# Patient Record
Sex: FEMALE | Race: WHITE | Employment: STUDENT | URBAN - METROPOLITAN AREA
[De-identification: names, ages, dates, MRNs, and addresses within clinical notes are randomized per-mention and may not be internally consistent; named-entity substitution may affect disease eponyms.]

---
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
## 2022-01-29 ENCOUNTER — HEALTH MAINTENANCE LETTER (OUTPATIENT)
Age: 22
End: 2022-01-29

## 2022-03-04 ENCOUNTER — TELEPHONE (OUTPATIENT)
Dept: FAMILY MEDICINE | Facility: CLINIC | Age: 22
End: 2022-03-04
Payer: COMMERCIAL

## 2022-03-04 NOTE — TELEPHONE ENCOUNTER
Patient's father called, stated his name is Dr. Tank Mejia, requests call back regarding his daughter's skin condition.   Father requests a call to [phone].     Thank you!

## 2022-03-04 NOTE — TELEPHONE ENCOUNTER
Left message on answering machine for patient to call back. Advised that father called and left message to all him back  Advise that we would like to speak with her about her condition or she can send a [employer] message with questions

## 2022-03-07 NOTE — TELEPHONE ENCOUNTER
[facility] Call Center    Phone Message    May a detailed message be left on voicemail: no     Reason for Call: Other: Father Sachin wanted to discuss his daughter's appt without discussing details of her condition. Advised that we reached out to Migdalia Buckley to get consent. Please call Sachin at [phone]. Thank you.     Action Taken: Message routed to:  Other: [facility]    Travel Screening: Not Applicable

## 2022-03-07 NOTE — TELEPHONE ENCOUNTER
Called and spoke to patient.    Patient sent [employer] message giving consent for RN to speak w/ her father, see below:  Hello I received a voicemail saying my father called and he would like to speak to my doctor about some medical questions. My father is a doctor as well and would like to discuss questions that I myself cannot ask/know to ask. He lives in a different state from [place] so he would like to be able to call you himself, so if you could give him a call back that would be great! His number is [phone]! He may try calling again on Monday. Thank you    RN informed patient she will give her father a call.    Patient voiced understanding.    ARPIT Tena-BSN-PHN  [facility]  [phone]

## 2022-03-07 NOTE — TELEPHONE ENCOUNTER
Called and spoke to patients fatherSachin (verbal/written consent given by patient).    Per Sachin patient has hx of tinea versicolor. Patients father and sister also have hx of tinea versicolor, both successfully treated w/ fluconazole pills.    Per Sachin patients tinea is starting to spread to her face- hoping to get patient in for an earlier appointment.    Scheduled following day appointment (due to a cancellation).     Sachin will notify patient and voiced understanding.    ARPIT Tena-BSN-PHN  [facility]  [phone]

## 2022-03-08 ENCOUNTER — OFFICE VISIT (OUTPATIENT)
Dept: DERMATOLOGY | Facility: CLINIC | Age: 22
End: 2022-03-08
Payer: COMMERCIAL

## 2022-03-08 VITALS — DIASTOLIC BLOOD PRESSURE: 71 MMHG | HEART RATE: 89 BPM | OXYGEN SATURATION: 99 % | SYSTOLIC BLOOD PRESSURE: 109 MMHG

## 2022-03-08 DIAGNOSIS — B36.0 TINEA VERSICOLOR DUE TO MALASSEZIA FURFUR: Primary | ICD-10-CM

## 2022-03-08 PROCEDURE — 99204 OFFICE O/P NEW MOD 45 MIN: CPT | Performed by: PHYSICIAN ASSISTANT

## 2022-03-08 RX ORDER — ITRACONAZOLE 100 MG/1
CAPSULE ORAL
Qty: 28 CAPSULE | Refills: 2 | Status: SHIPPED | OUTPATIENT
Start: 2022-03-08

## 2022-03-08 RX ORDER — NORETHINDRONE ACETATE AND ETHINYL ESTRADIOL .02; 1 MG/1; MG/1
TABLET ORAL
COMMUNITY
Start: 2017-02-08

## 2022-03-08 RX ORDER — KETOCONAZOLE 20 MG/ML
SHAMPOO TOPICAL
Qty: 120 ML | Refills: 11 | Status: SHIPPED | OUTPATIENT
Start: 2022-03-08

## 2022-03-08 RX ORDER — LEVONORGESTREL 19.5 MG/1
1 INTRAUTERINE DEVICE INTRAUTERINE ONCE
COMMUNITY

## 2022-03-08 NOTE — PROGRESS NOTES
HPI:   Chief complaints: Migdalia Mejia is a pleasant 21 year old female who presents for evaluation of recurrent tinea versicolor. She has used shampoos, creams and fluconazole in the past but it keeps coming back.       PHYSICAL EXAM:    /71   Pulse 89   SpO2 99%   Breastfeeding No   Skin exam performed as follows: Type 2 skin. Mood appropriate  Alert and Oriented X 3. Well developed, well nourished in no distress.  General appearance: Normal  Head including face: Normal  Eyes: conjunctiva and lids: Normal  Mouth: Lips, teeth, gums: Normal  Neck: Normal  Cardiovascular: Exam of peripheral vascular system by observation for swelling, varicosities, edema: Normal  Right upper extremity: Normal  Left upper extremity: Normal  Right lower extremity: Normal  Left lower extremity: Normal  Skin: Scalp and body hair: See below    Hypopigmented scaly macules on the back, neck and chest    ASSESSMENT/PLAN:     1. Tinea versicolor - advised on diagnosis and treatment options. Discussed that is secondary to fungal overgrowth that tends to worsen with heat and perspiration. Condition does tend be be recurrent.   --Start sporanox 200 mg BID x 1 week   --Start ketoconazole shampoo weekly to chest and back for maintenance          Follow-up: PRN  CC:   Scribed By: Migdalia Coy, MS, PA-C

## 2022-03-08 NOTE — LETTER
3/8/2022         RE: Migdalia Mejia  [address]  [address]  [place] [address]        Dear Colleague,    Thank you for referring your patient, Migdalia Mejia, to the [facility]. Please see a copy of my visit note below.    HPI:   Chief complaints: Migdalia Mejia is a pleasant 21 year old female who presents for evaluation of recurrent tinea versicolor. She has used shampoos, creams and fluconazole in the past but it keeps coming back.       PHYSICAL EXAM:    /71   Pulse 89   SpO2 99%   Breastfeeding No   Skin exam performed as follows: Type 2 skin. Mood appropriate  Alert and Oriented X 3. Well developed, well nourished in no distress.  General appearance: Normal  Head including face: Normal  Eyes: conjunctiva and lids: Normal  Mouth: Lips, teeth, gums: Normal  Neck: Normal  Cardiovascular: Exam of peripheral vascular system by observation for swelling, varicosities, edema: Normal  Right upper extremity: Normal  Left upper extremity: Normal  Right lower extremity: Normal  Left lower extremity: Normal  Skin: Scalp and body hair: See below    Hypopigmented scaly macules on the back, neck and chest    ASSESSMENT/PLAN:     1. Tinea versicolor - advised on diagnosis and treatment options. Discussed that is secondary to fungal overgrowth that tends to worsen with heat and perspiration. Condition does tend be be recurrent.   --Start sporanox 200 mg BID x 1 week   --Start ketoconazole shampoo weekly to chest and back for maintenance          Follow-up: PRN  CC:   Scribed By: Migdalia Coy, MS, PAREGAN          Again, thank you for allowing me to participate in the care of your patient.        Sincerely,        Migdalia Coy PA-C

## 2022-09-11 ENCOUNTER — HEALTH MAINTENANCE LETTER (OUTPATIENT)
Age: 22
End: 2022-09-11

## 2023-05-06 ENCOUNTER — HEALTH MAINTENANCE LETTER (OUTPATIENT)
Age: 23
End: 2023-05-06

## 2023-11-07 ENCOUNTER — OFFICE VISIT (OUTPATIENT)
Dept: URGENT CARE | Facility: URGENT CARE | Age: 23
End: 2023-11-07
Payer: COMMERCIAL

## 2023-11-07 VITALS
SYSTOLIC BLOOD PRESSURE: 120 MMHG | WEIGHT: 156 LBS | OXYGEN SATURATION: 98 % | DIASTOLIC BLOOD PRESSURE: 73 MMHG | BODY MASS INDEX: 26.78 KG/M2 | HEART RATE: 74 BPM | TEMPERATURE: 98.4 F

## 2023-11-07 DIAGNOSIS — N89.8 VAGINAL DISCHARGE: Primary | ICD-10-CM

## 2023-11-07 DIAGNOSIS — B37.31 CANDIDAL VULVOVAGINITIS: ICD-10-CM

## 2023-11-07 DIAGNOSIS — B96.89 BV (BACTERIAL VAGINOSIS): ICD-10-CM

## 2023-11-07 DIAGNOSIS — N76.0 BV (BACTERIAL VAGINOSIS): ICD-10-CM

## 2023-11-07 LAB
ALBUMIN UR-MCNC: ABNORMAL MG/DL
AMORPH CRY #/AREA URNS HPF: ABNORMAL /HPF
APPEARANCE UR: CLEAR
BACTERIA #/AREA URNS HPF: ABNORMAL /HPF
BILIRUB UR QL STRIP: NEGATIVE
CLUE CELLS: PRESENT
COLOR UR AUTO: YELLOW
GLUCOSE UR STRIP-MCNC: NEGATIVE MG/DL
HGB UR QL STRIP: ABNORMAL
KETONES UR STRIP-MCNC: NEGATIVE MG/DL
LEUKOCYTE ESTERASE UR QL STRIP: ABNORMAL
NITRATE UR QL: NEGATIVE
PH UR STRIP: 7 [PH] (ref 5–7)
RBC #/AREA URNS AUTO: ABNORMAL /HPF
SP GR UR STRIP: 1.02 (ref 1–1.03)
SQUAMOUS #/AREA URNS AUTO: ABNORMAL /LPF
TRICHOMONAS, WET PREP: ABNORMAL
UROBILINOGEN UR STRIP-ACNC: 1 E.U./DL
WBC #/AREA URNS AUTO: ABNORMAL /HPF
WBC'S/HIGH POWER FIELD, WET PREP: ABNORMAL
YEAST, WET PREP: PRESENT

## 2023-11-07 PROCEDURE — 81001 URINALYSIS AUTO W/SCOPE: CPT

## 2023-11-07 PROCEDURE — 87210 SMEAR WET MOUNT SALINE/INK: CPT

## 2023-11-07 PROCEDURE — 99204 OFFICE O/P NEW MOD 45 MIN: CPT

## 2023-11-07 PROCEDURE — 87591 N.GONORRHOEAE DNA AMP PROB: CPT | Performed by: PHYSICIAN ASSISTANT

## 2023-11-07 PROCEDURE — 87491 CHLMYD TRACH DNA AMP PROBE: CPT | Performed by: PHYSICIAN ASSISTANT

## 2023-11-07 PROCEDURE — 87086 URINE CULTURE/COLONY COUNT: CPT

## 2023-11-07 RX ORDER — FLUCONAZOLE 150 MG/1
150 TABLET ORAL ONCE
Qty: 1 TABLET | Refills: 0 | Status: SHIPPED | OUTPATIENT
Start: 2023-11-07 | End: 2023-11-07

## 2023-11-07 RX ORDER — METRONIDAZOLE 500 MG/1
500 TABLET ORAL 2 TIMES DAILY
Qty: 14 TABLET | Refills: 0 | Status: SHIPPED | OUTPATIENT
Start: 2023-11-07 | End: 2023-11-14

## 2023-11-07 NOTE — PROGRESS NOTES
Chief Complaint   Patient presents with    Urgent Care     Vaginal discharge x Sunday, left flank pain poss related to muscle - unprotected sex on weekend        ASSESSMENT/PLAN:  Migdalia was seen today for urgent care.    Diagnoses and all orders for this visit:    Vaginal discharge  -     Chlamydia & Gonorrhea by PCR, GICH/Range - Clinic Collect  -     Wet prep - Clinic Collect  -     UA Macroscopic with reflex to Microscopic and Culture - Clinic Collect  -     Urine Microscopic Exam  -     Urine Culture    BV (bacterial vaginosis)  -     metroNIDAZOLE (FLAGYL) 500 MG tablet; Take 1 tablet (500 mg) by mouth 2 times daily for 7 days    Candidal vulvovaginitis  -     fluconazole (DIFLUCAN) 150 MG tablet; Take 1 tablet (150 mg) by mouth once for 1 dose    Yeast and clue cells on wet prep.  Will start Diflucan.  She does have discharge with odor that seems most consistent with BV and will treat for this today.  Not empirically treated for chlamydia or gonorrhea but if tests come back positive we will treat accordingly  Does have some mild left abdominal and flank pain but no other signs of urinary tract infection.  UA has some signs of infection but no nitrites.  We will send this for culture and would recommend treatment with Keflex or Bactrim if culture comes back positive    Jose Murguia PA-C      SUBJECTIVE:  Migdalia is a 23 year old female who presents to urgent care with 2 days of vaginal discharge.  Its either clear or slightly opaque and yellowish and has a bad odor that smells somewhat sour.  She is having a little bit of blood-tinged in the discharge as well.  She is due for menses.  She had unprotected sex with a new partner that she is not sure of their background or previous sexual history 3 to 4 days ago.  She would like to be tested for STDs.  She has had 1 day of left lower abdominal crampy pain and slight flank pain.  She also feels a little bit achy but this seemed to resolve.  Denies any urinary  frequency, urgency or burning    ROS: Pertinent ROS neg other than the symptoms noted above in the HPI.     OBJECTIVE:  /73   Pulse 74   Temp 98.4  F (36.9  C) (Tympanic)   Wt 70.8 kg (156 lb)   SpO2 98%   BMI 26.78 kg/m         DIAGNOSTICS    Results for orders placed or performed in visit on 11/07/23   UA Macroscopic with reflex to Microscopic and Culture - Clinic Collect     Status: Abnormal    Specimen: Urine, Clean Catch   Result Value Ref Range    Color Urine Yellow Colorless, Straw, Light Yellow, Yellow    Appearance Urine Clear Clear    Glucose Urine Negative Negative mg/dL    Bilirubin Urine Negative Negative    Ketones Urine Negative Negative mg/dL    Specific Gravity Urine 1.020 1.003 - 1.035    Blood Urine Trace (A) Negative    pH Urine 7.0 5.0 - 7.0    Protein Albumin Urine Trace (A) Negative mg/dL    Urobilinogen Urine 1.0 0.2, 1.0 E.U./dL    Nitrite Urine Negative Negative    Leukocyte Esterase Urine Moderate (A) Negative   Urine Microscopic Exam     Status: Abnormal   Result Value Ref Range    Bacteria Urine Moderate (A) None Seen /HPF    RBC Urine 0-2 0-2 /HPF /HPF    WBC Urine 5-10 (A) 0-5 /HPF /HPF    Squamous Epithelials Urine Few (A) None Seen /LPF    Amorphous Crystals Urine Few (A) None Seen /HPF   Wet prep - Clinic Collect     Status: Abnormal    Specimen: Vagina; Swab   Result Value Ref Range    Trichomonas Absent Absent    Yeast Present (A) Absent    Clue Cells Present (A) Absent    WBCs/high power field 2+ (A) None   , No nitrites and she    Current Outpatient Medications   Medication    itraconazole (SPORANOX) 100 MG capsule    ketoconazole (NIZORAL) 2 % external shampoo    levonorgestrel (KYLEENA) 19.5 MG IUD    norethindrone-ethinyl estradiol (MICROGESTIN 1/20) 1-20 MG-MCG tablet     No current facility-administered medications for this visit.      There is no problem list on file for this patient.     No past medical history on file.  No past surgical history on  file.  Family History   Problem Relation Age of Onset    Skin Cancer Mother      Social History     Tobacco Use    Smoking status: Every Day    Smokeless tobacco: Never    Tobacco comments:     vape, not tobacco  cigarettes  - discussed Quit Plan   Substance Use Topics    Alcohol use: Not on file              The plan of care was discussed with the patient. They understand and agree with the course of treatment prescribed. A printed summary was given including instructions and medications.  The use of Dragon/Everypoint dictation services may have been used to construct the content in this note; any grammatical or spelling errors are non-intentional. Please contact the author of this note directly if you are in need of any clarification.

## 2023-11-08 LAB
BACTERIA UR CULT: NORMAL
C TRACH DNA SPEC QL PROBE+SIG AMP: NEGATIVE
N GONORRHOEA DNA SPEC QL NAA+PROBE: NEGATIVE

## 2023-12-03 ASSESSMENT — PATIENT HEALTH QUESTIONNAIRE - PHQ9
SUM OF ALL RESPONSES TO PHQ QUESTIONS 1-9: 5
SUM OF ALL RESPONSES TO PHQ QUESTIONS 1-9: 5
10. IF YOU CHECKED OFF ANY PROBLEMS, HOW DIFFICULT HAVE THESE PROBLEMS MADE IT FOR YOU TO DO YOUR WORK, TAKE CARE OF THINGS AT HOME, OR GET ALONG WITH OTHER PEOPLE: SOMEWHAT DIFFICULT

## 2023-12-03 ASSESSMENT — ENCOUNTER SYMPTOMS
DIZZINESS: 0
HEARTBURN: 0
PARESTHESIAS: 0
JOINT SWELLING: 0
ABDOMINAL PAIN: 0
ARTHRALGIAS: 0
COUGH: 0
FEVER: 0
SORE THROAT: 0
WEAKNESS: 0
FREQUENCY: 0
DIARRHEA: 0
MYALGIAS: 0
DYSURIA: 0
EYE PAIN: 0
PALPITATIONS: 0
HEMATOCHEZIA: 0
HEADACHES: 0
SHORTNESS OF BREATH: 0
CHILLS: 0
CONSTIPATION: 1
NERVOUS/ANXIOUS: 0
NAUSEA: 0
HEMATURIA: 0

## 2023-12-04 ENCOUNTER — OFFICE VISIT (OUTPATIENT)
Dept: MIDWIFE SERVICES | Facility: CLINIC | Age: 23
End: 2023-12-04
Payer: COMMERCIAL

## 2023-12-04 VITALS
SYSTOLIC BLOOD PRESSURE: 117 MMHG | HEIGHT: 64 IN | DIASTOLIC BLOOD PRESSURE: 62 MMHG | BODY MASS INDEX: 27.39 KG/M2 | WEIGHT: 160.4 LBS

## 2023-12-04 DIAGNOSIS — Z30.430 ENCOUNTER FOR IUD INSERTION: ICD-10-CM

## 2023-12-04 DIAGNOSIS — Z01.419 ENCNTR FOR GYN EXAM (GENERAL) (ROUTINE) W/O ABN FINDINGS: Primary | ICD-10-CM

## 2023-12-04 DIAGNOSIS — Z12.4 CERVICAL CANCER SCREENING: ICD-10-CM

## 2023-12-04 PROCEDURE — 90715 TDAP VACCINE 7 YRS/> IM: CPT | Performed by: ADVANCED PRACTICE MIDWIFE

## 2023-12-04 PROCEDURE — 90480 ADMN SARSCOV2 VAC 1/ONLY CMP: CPT | Performed by: ADVANCED PRACTICE MIDWIFE

## 2023-12-04 PROCEDURE — 90651 9VHPV VACCINE 2/3 DOSE IM: CPT | Performed by: ADVANCED PRACTICE MIDWIFE

## 2023-12-04 PROCEDURE — 99385 PREV VISIT NEW AGE 18-39: CPT | Mod: 25 | Performed by: ADVANCED PRACTICE MIDWIFE

## 2023-12-04 PROCEDURE — 90471 IMMUNIZATION ADMIN: CPT | Performed by: ADVANCED PRACTICE MIDWIFE

## 2023-12-04 PROCEDURE — G0145 SCR C/V CYTO,THINLAYER,RESCR: HCPCS | Performed by: ADVANCED PRACTICE MIDWIFE

## 2023-12-04 PROCEDURE — 90472 IMMUNIZATION ADMIN EACH ADD: CPT | Performed by: ADVANCED PRACTICE MIDWIFE

## 2023-12-04 PROCEDURE — 91320 SARSCV2 VAC 30MCG TRS-SUC IM: CPT | Performed by: ADVANCED PRACTICE MIDWIFE

## 2023-12-04 RX ORDER — MISOPROSTOL 100 UG/1
TABLET ORAL
Qty: 2 TABLET | Refills: 0 | Status: SHIPPED | OUTPATIENT
Start: 2023-12-04

## 2023-12-04 ASSESSMENT — ANXIETY QUESTIONNAIRES
IF YOU CHECKED OFF ANY PROBLEMS ON THIS QUESTIONNAIRE, HOW DIFFICULT HAVE THESE PROBLEMS MADE IT FOR YOU TO DO YOUR WORK, TAKE CARE OF THINGS AT HOME, OR GET ALONG WITH OTHER PEOPLE: SOMEWHAT DIFFICULT
3. WORRYING TOO MUCH ABOUT DIFFERENT THINGS: MORE THAN HALF THE DAYS
2. NOT BEING ABLE TO STOP OR CONTROL WORRYING: SEVERAL DAYS
1. FEELING NERVOUS, ANXIOUS, OR ON EDGE: MORE THAN HALF THE DAYS
6. BECOMING EASILY ANNOYED OR IRRITABLE: NOT AT ALL
7. FEELING AFRAID AS IF SOMETHING AWFUL MIGHT HAPPEN: NOT AT ALL

## 2023-12-04 ASSESSMENT — PATIENT HEALTH QUESTIONNAIRE - PHQ9: 5. POOR APPETITE OR OVEREATING: NOT AT ALL

## 2023-12-04 NOTE — PROGRESS NOTES
"Migdalia is a 23 year old No obstetric history on file. female who presents for annual exam.     Besides routine health maintenance, she has no other health concerns today .  HPI:  Migdalia would like to review options for IUD. After reviewed the potential risk/alternative/benefits she plans on coming back for the Mirena IUD. Cytotec premedication ordered for the night before. Instructed to eat and take 800 mg Ibuprofen the day of the visit. Has been on CHRISTINE's and is unable to be consistent in taking them. Does have a sister with endometriosis and is concerned about this for herself.   Menses are regular q 28-30 days and heavy lasting  7-8  days.   Menses flow: heavy.    Patient's last menstrual period was 11/23/2023 (exact date)..   Using oral contraceptives for contraception.    She is currently considering pregnancy.    REPRODUCTIVE/GYNECOLOGIC HISTORY:  Migdalia is sexually active with male partner(s) and is currently in monogamous relationship.   STI testing offered?  Declined  History of abnormal Pap smear:  No  SOCIAL HISTORY  Abuse: does not report having previously been physical or sexually abused.    Do you feel safe in your environment? YES   What types? NONE    She  reports that she has been smoking. She has never used smokeless tobacco.      Last PHQ-9 score on record =       12/4/2023    10:02 AM   PHQ-9 SCORE   PHQ-9 Total Score 3     Last GAD7 score on record =       6/16/2017     9:58 AM   JASMYN-7 SCORE   Total Score 1     Alcohol Score = 2    HEALTH MAINTENANCE:  Cholesterol: (No results found for: \"CHOL\" History of abnormal lipids:  NA  Will check lipids today  Mammo: N/A . History of abnormal Mammo: Not applicable, Due at age 39.  Regular Self Breast Exams: Yes, reviewed and encouraged   TSH: (No results found for: \"TSH\" ) no personal or family issues with thyroid   Pap; (No results found for: \"PAP\" ) first pap smear collected today   Immunizations up to date: See Care Gaps. Did have flu shot already at " outside locations. Accepting of Gardasil, Tdap and Covid.   (Gardasil, Tdap, Flu)  Health maintenance updated:  See  Care Gaps   Care Gaps  Close care gaps     Overdue          Never done NICOTINE/TOBACCO CESSATION COUNSELING Q 1 YR (Yearly)       Never done ADVANCE CARE PLANNING (Every 5 Years)       AUG 19   2003 IPV IMMUNIZATION (4 of 4 - 4-dose series)  Last completed: Feb 24, 2000     Never done Pneumococcal Vaccine: Pediatrics (0 to 5 Years) and At-Risk Patients (6 to 64 Years) (1 - PCV)       AUG 19   2006 DTAP/TDAP/TD IMMUNIZATION (5 - Tdap)  Last completed: Dec 19, 2000     Never done HPV IMMUNIZATION (1 - 2-dose series)       Never done HIV SCREENING (Once)       Never done HEPATITIS C SCREENING (Once)       JUN 16 2018 YEARLY PREVENTIVE VISIT (Yearly)  Last completed: Jun 16, 2017     Never done PAP (Every 3 Years)   Order placed this encounter     Never done INFLUENZA VACCINE (1)       SEP 1   2023 COVID-19 Vaccine (3 - 2023-24 season)   Order placed this encounter        Upcoming       NOV 7 2024 CHLAMYDIA SCREENING (Yearly)  Last completed: Nov 7, 2023     HEALTHY HABITS  Eating habits: eats regular meals and lactose and dairy intolerant. Feels she could have better eating habits at times but difficult being a student.   Calcium/Vitamin D intake: source:  dairy Adequate? No very limited due to lactose intolerance. Recommended regular supplementation.   Exercise: How often do you exercise? 5-7 times/week;Cardio and Strength Training  Have you had an eye exam in the last two years? YES     Do you routinely see the dentist once or twice yearly? YES         HISTORY:  OB History   No obstetric history on file.     No past medical history on file.  No past surgical history on file.  Family History   Problem Relation Age of Onset    Skin Cancer Mother      Social History     Socioeconomic History    Marital status: Single   Tobacco Use    Smoking status: Every Day    Smokeless tobacco: Never    Tobacco  "comments:     vape, not tobacco  cigarettes  - discussed Quit Plan       Current Outpatient Medications:     itraconazole (SPORANOX) 100 MG capsule, 2 tabs po bid x 7 days, Disp: 28 capsule, Rfl: 2    ketoconazole (NIZORAL) 2 % external shampoo, Use once weekly, Disp: 120 mL, Rfl: 11    levonorgestrel (KYLEENA) 19.5 MG IUD, 1 each by Intrauterine route once (Patient not taking: Reported on 12/4/2023), Disp: , Rfl:     norethindrone-ethinyl estradiol (MICROGESTIN 1/20) 1-20 MG-MCG tablet, , Disp: , Rfl:    No Known Allergies    Past medical, surgical, social and family history were reviewed and updated in EPIC.    ROS:   12 point review of systems negative other than symptoms noted below or in the HPI.    PHYSICAL EXAM:  /62   Ht 1.626 m (5' 4\")   Wt 72.8 kg (160 lb 6.4 oz)   LMP 11/23/2023 (Exact Date)   BMI 27.53 kg/m     BMI: Body mass index is 27.53 kg/m .  Constitutional: healthy, alert and no distress  Neck: symmetrical, thyroid normal size, no masses present, no lymphadenopathy present.   Cardiovascular: RRR, no murmurs present  Respiratory: breathing unlabored, lungs CTA bilaterally  Breast:normal without masses, tenderness or nipple discharge and no palpable axillary masses or adenopathy  Gastrointestinal: abdomen soft, non-tender, bowel sounds present  PELVIC EXAM:  Vulva: No lesions, no adenopathy, BUS WNL  Vagina: Moist, pink, discharge normal  well rugated, no lesions  Cervix:smooth, pink, no visible lesions. Pap smear collected. Declines any STI testing. States she has had this done at another provider already.   Uterus: Normal size, non-tender, mobile  Ovaries: No masses palpated  Rectal exam: deferred    ASSESSMENT/PLAN:    ICD-10-CM    1. Encntr for gyn exam (general) (routine) w/o abn findings  Z01.419       2. Cervical cancer screening  Z12.4         No results found for any visits on 12/04/23.      COUNSELING:   Reviewed preventive health counseling, as reflected in patient " instructions       Regular exercise       Healthy diet/nutrition       Vision/dental screening       Immunizations  Vaccinated for: Covid-19, Human Papillomavirus, and TDAP. Ran out of time at the end of visit and will come back for a nurse visit for shots.          Alcohol Use       Contraception- IUD consult preformed and premedication ordered for procedure.        Osteoporosis prevention/bone health      Jane Bo CNM      Answers submitted by the patient for this visit:  Patient Health Questionnaire (Submitted on 12/3/2023)  If you checked off any problems, how difficult have these problems made it for you to do your work, take care of things at home, or get along with other people?: Somewhat difficult  PHQ9 TOTAL SCORE: 5  Annual Preventive Visit (Submitted on 12/3/2023)  Chief Complaint: Annual Exam:  Frequency of exercise:: 2-3 days/week  Getting at least 3 servings of Calcium per day:: Yes  Diet:: Gluten-free/reduced  Taking medications regularly:: No  Bi-annual eye exam:: Yes  Dental care twice a year:: Yes  Sleep apnea or symptoms of sleep apnea:: None  abdominal pain: No  Blood in stool: No  Blood in urine: No  chest pain: No  chills: No  congestion: No  constipation: Yes  cough: No  diarrhea: No  dizziness: No  ear pain: No  eye pain: No  nervous/anxious: No  fever: No  frequency: No  genital sores: No  headaches: No  hearing loss: No  heartburn: No  arthralgias: No  joint swelling: No  peripheral edema: No  mood changes: No  myalgias: No  nausea: No  dysuria: No  palpitations: No  Skin sensation changes: No  sore throat: No  urgency: No  rash: No  shortness of breath: No  visual disturbance: No  weakness: No  Exercise outside of work (Submitted on 12/3/2023)  Chief Complaint: Annual Exam:  Barriers to taking medications (Submitted on 12/3/2023)  Chief Complaint: Annual Exam:  Barriers to taking medications:: Problems remembering to take them

## 2023-12-07 LAB
BKR LAB AP GYN ADEQUACY: NORMAL
BKR LAB AP GYN INTERPRETATION: NORMAL
BKR LAB AP HPV REFLEX: NO
BKR LAB AP PREVIOUS ABNORMAL: NORMAL
PATH REPORT.COMMENTS IMP SPEC: NORMAL
PATH REPORT.COMMENTS IMP SPEC: NORMAL
PATH REPORT.RELEVANT HX SPEC: NORMAL

## 2024-11-09 ASSESSMENT — PATIENT HEALTH QUESTIONNAIRE - PHQ9: SUM OF ALL RESPONSES TO PHQ QUESTIONS 1-9: 3

## 2025-01-12 ENCOUNTER — HEALTH MAINTENANCE LETTER (OUTPATIENT)
Age: 25
End: 2025-01-12